# Patient Record
Sex: FEMALE | Race: BLACK OR AFRICAN AMERICAN | Employment: FULL TIME | URBAN - METROPOLITAN AREA
[De-identification: names, ages, dates, MRNs, and addresses within clinical notes are randomized per-mention and may not be internally consistent; named-entity substitution may affect disease eponyms.]

---

## 2022-12-05 ENCOUNTER — HOSPITAL ENCOUNTER (EMERGENCY)
Age: 47
Discharge: HOME | End: 2022-12-05
Attending: EMERGENCY MEDICINE
Payer: COMMERCIAL

## 2022-12-05 VITALS
SYSTOLIC BLOOD PRESSURE: 171 MMHG | HEART RATE: 68 BPM | BODY MASS INDEX: 19.63 KG/M2 | OXYGEN SATURATION: 99 % | WEIGHT: 115 LBS | RESPIRATION RATE: 18 BRPM | TEMPERATURE: 98.4 F | HEIGHT: 64 IN | DIASTOLIC BLOOD PRESSURE: 97 MMHG

## 2022-12-05 DIAGNOSIS — J04.0 LARYNGITIS, ACUTE: Primary | ICD-10-CM

## 2022-12-05 LAB
COVID-19 RAPID TEST, COVR: NOT DETECTED
FLUAV AG NPH QL IA: NEGATIVE
FLUBV AG NOSE QL IA: NEGATIVE
SOURCE, COVRS: NORMAL

## 2022-12-05 PROCEDURE — 74011250637 HC RX REV CODE- 250/637: Performed by: NURSE PRACTITIONER

## 2022-12-05 PROCEDURE — 87635 SARS-COV-2 COVID-19 AMP PRB: CPT

## 2022-12-05 PROCEDURE — 99283 EMERGENCY DEPT VISIT LOW MDM: CPT

## 2022-12-05 PROCEDURE — 87804 INFLUENZA ASSAY W/OPTIC: CPT

## 2022-12-05 RX ORDER — IBUPROFEN 800 MG/1
800 TABLET ORAL
Status: COMPLETED | OUTPATIENT
Start: 2022-12-05 | End: 2022-12-05

## 2022-12-05 RX ORDER — IBUPROFEN 800 MG/1
800 TABLET ORAL
Qty: 20 TABLET | Refills: 0 | Status: SHIPPED | OUTPATIENT
Start: 2022-12-05 | End: 2022-12-12

## 2022-12-05 RX ADMIN — IBUPROFEN 800 MG: 800 TABLET, FILM COATED ORAL at 20:26

## 2022-12-05 NOTE — ED TRIAGE NOTES
Patient arrives with c/o cough, sore throat, loss of voice, and nasal congestion 2 day. Denies N/V/D, fever, SOB, chest pain.

## 2022-12-06 NOTE — ED PROVIDER NOTES
Mj Peoples is a 52 y.o. F who presents to the ED today with CC of nasal congestion cough, sore throat, voice loss. PCP: None    There are no other complaints, changes or physical findings at this time. PMH: Denies chronic health conditions    Surgical History: None  Smoking: None  Alcohol: None  Drug Use: None  ALLERGIES: Patient has no known allergies. No past medical history on file. No past surgical history on file. No family history on file. Social History     Socioeconomic History    Marital status: SINGLE     Spouse name: Not on file    Number of children: Not on file    Years of education: Not on file    Highest education level: Not on file   Occupational History    Not on file   Tobacco Use    Smoking status: Not on file    Smokeless tobacco: Not on file   Substance and Sexual Activity    Alcohol use: Not on file    Drug use: Not on file    Sexual activity: Not on file   Other Topics Concern    Not on file   Social History Narrative    Not on file     Social Determinants of Health     Financial Resource Strain: Not on file   Food Insecurity: Not on file   Transportation Needs: Not on file   Physical Activity: Not on file   Stress: Not on file   Social Connections: Not on file   Intimate Partner Violence: Not on file   Housing Stability: Not on file             Review of Systems   Constitutional:  Positive for chills. Negative for fever. HENT:  Positive for congestion, postnasal drip, rhinorrhea, sore throat and voice change. Eyes:  Negative for discharge. Respiratory:  Negative for shortness of breath. Cardiovascular:  Negative for chest pain. Gastrointestinal:  Negative for nausea. Genitourinary:  Negative for dysuria. Musculoskeletal:  Negative for myalgias. Neurological:  Negative for seizures. Psychiatric/Behavioral:  Negative for behavioral problems.       Vitals:    12/05/22 1848   BP: (!) 171/97   Pulse: 68   Resp: 18   Temp: 98.4 °F (36.9 °C)   SpO2: 99%   Weight: 52.2 kg (115 lb)   Height: 5' 4\" (1.626 m)            Physical Exam  Constitutional:       Appearance: Normal appearance. HENT:      Head: Normocephalic and atraumatic. Right Ear: Tympanic membrane and ear canal normal.      Left Ear: Tympanic membrane and ear canal normal.      Nose: Congestion present. Mouth/Throat:      Mouth: Mucous membranes are moist.      Pharynx: Uvula midline. Posterior oropharyngeal erythema present. No oropharyngeal exudate or uvula swelling. Tonsils: No tonsillar exudate or tonsillar abscesses. 0 on the right. 0 on the left. Comments: Cobblestoning noted to back of pharynx  Eyes:      Pupils: Pupils are equal, round, and reactive to light. Cardiovascular:      Rate and Rhythm: Normal rate and regular rhythm. Heart sounds: Normal heart sounds. Pulmonary:      Effort: No respiratory distress. Breath sounds: No wheezing. Abdominal:      General: Abdomen is flat. Palpations: Abdomen is soft. Tenderness: There is no abdominal tenderness. Musculoskeletal:      Cervical back: Normal range of motion. Skin:     General: Skin is warm and dry. Neurological:      General: No focal deficit present. Mental Status: She is alert and oriented to person, place, and time. Mental status is at baseline. Cranial Nerves: No cranial nerve deficit. Motor: No weakness. Psychiatric:         Mood and Affect: Mood normal.         Behavior: Behavior normal.            LABORATORY RESULTS:  Recent Results (from the past 24 hour(s))   INFLUENZA A+B VIRAL AGS    Collection Time: 12/05/22  8:25 PM   Result Value Ref Range    Influenza A Antigen Negative NEG      Influenza B Antigen Negative NEG     COVID-19 RAPID TEST    Collection Time: 12/05/22  8:25 PM   Result Value Ref Range    Specimen source Nasopharyngeal      COVID-19 rapid test Not detected NOTD         IMAGING RESULTS:  No results found.     EKG INTERPRETATION:   See course summary for interpretation if ordered    MEDICATIONS GIVEN:  Medications   ibuprofen (MOTRIN) tablet 800 mg (800 mg Oral Given 12/5/22 2026)            MDM  Number of Diagnoses or Management Options  Laryngitis, acute  Diagnosis management comments: Seen today for flulike symptoms. No difficulty breathing, seizure, syncope. Eating and drinking appropriately, no change in bowel or bladder. Physical exam is unremarkable, in no acute distress, normal inspiratory and expiratory effort, lungs CTA bilaterally. No evidence of PTA, AOM. Oxygen saturation is 99 on room air. Considered differentials including Influenza, COVID-19, pneumonia. Positive for Influenza A. No indication for Tamiflu. Discharged with symptomatic management, Tylenol Motrin for any fevers/myalgias/pain. Recommend follow-up with PCP and given return  precautions. Recommended drinking plenty of fluids, resting, and staying home until fever free for 24 hours without the use of antipyretics. '           Discussed results and work-up with patient and answered all questions, the patient expresses understanding and agrees with the care plan and disposition. The patient was given an opportunity to ask questions and all concerns raised were addressed prior to discharge. Recommended patient follow-up with provider as listed below. Counseled patient on standard home and self-care measures. Specifically explained the emergent conditions that could arise and clearly instructed the patient to return to the emergency department for those and any other new, worsening, or concerning symptoms. Patient stable and ready for discharge. IMPRESSION:  1.  Laryngitis, acute        DISPOSITION:  Discharge    PLAN:  Follow-up Information       Follow up With Specialties Details Why [address] [facility] Emergency Medicine  As needed, If symptoms worsen [address] [address]  [phone]          Discharge Medication List as of 12/5/2022  8:57 PM Please note that this dictation was completed with [email or web address], the computer voice recognition software. Quite often unanticipated grammatical, syntax, homophones, and other interpretive errors are inadvertently transcribed by the computer software. Please disregard these errors. Please excuse any errors that have escaped final proofreading.

## 2022-12-06 NOTE — DISCHARGE INSTRUCTIONS
Tonight you were seen in the emergency room for a viral illness. You may continue to feel sick for the coming week or two, but there are some things you can do to feel better. I recommend increasing your fluid intake. Very warm or very cold fluids feel best when you have been coughing and have a sore throat. I recommend hot tea with honey, popsicles and soups. Rest often and stay away from others to control the spread of the virus    We do not have many good cough medicines that can eliminate your cough. That being said there have been multiple studies that show honey can improve/ quiet your cough. You can having this plain on a spoon or mixed into a hot drink such as tea. Viral illnesses can changes as they go along, likely your symptoms shall improve over time. If for any reason your symptoms worsen please follow-up with your primary care provider. If you have a high fever that is not relieved by over-the-counter fever reducing medications or difficulty breathing please come back to the emergency room for reevaluation.

## 2025-08-12 ENCOUNTER — HOSPITAL ENCOUNTER (EMERGENCY)
Facility: HOSPITAL | Age: 50
Discharge: HOME | End: 2025-08-13
Attending: EMERGENCY MEDICINE
Payer: COMMERCIAL

## 2025-08-12 VITALS
SYSTOLIC BLOOD PRESSURE: 139 MMHG | OXYGEN SATURATION: 100 % | DIASTOLIC BLOOD PRESSURE: 90 MMHG | TEMPERATURE: 97.9 F | BODY MASS INDEX: 21.34 KG/M2 | HEART RATE: 65 BPM | HEIGHT: 64 IN | WEIGHT: 125 LBS | RESPIRATION RATE: 16 BRPM

## 2025-08-12 DIAGNOSIS — H65.00 ACUTE SEROUS OTITIS MEDIA, RECURRENCE NOT SPECIFIED, UNSPECIFIED LATERALITY: ICD-10-CM

## 2025-08-12 DIAGNOSIS — H92.01 OTALGIA OF RIGHT EAR: Primary | ICD-10-CM

## 2025-08-12 PROCEDURE — 99283 EMERGENCY DEPT VISIT LOW MDM: CPT

## 2025-08-12 ASSESSMENT — PAIN DESCRIPTION - LOCATION: LOCATION: EAR

## 2025-08-12 ASSESSMENT — PAIN SCALES - GENERAL: PAINLEVEL_OUTOF10: 8

## 2025-08-12 ASSESSMENT — LIFESTYLE VARIABLES
HOW OFTEN DO YOU HAVE A DRINK CONTAINING ALCOHOL: NEVER
HOW MANY STANDARD DRINKS CONTAINING ALCOHOL DO YOU HAVE ON A TYPICAL DAY: PATIENT DOES NOT DRINK

## 2025-08-12 ASSESSMENT — PAIN - FUNCTIONAL ASSESSMENT: PAIN_FUNCTIONAL_ASSESSMENT: 0-10

## 2025-08-12 ASSESSMENT — PAIN DESCRIPTION - ORIENTATION: ORIENTATION: RIGHT

## 2025-08-13 PROCEDURE — 6370000000 HC RX 637 (ALT 250 FOR IP): Performed by: EMERGENCY MEDICINE

## 2025-08-13 RX ORDER — ACETAMINOPHEN 500 MG
1000 TABLET ORAL
Status: COMPLETED | OUTPATIENT
Start: 2025-08-13 | End: 2025-08-13

## 2025-08-13 RX ORDER — ACETAMINOPHEN 500 MG
1000 TABLET ORAL 3 TIMES DAILY
Qty: 60 TABLET | Refills: 0 | Status: SHIPPED | OUTPATIENT
Start: 2025-08-13 | End: 2025-08-23

## 2025-08-13 RX ORDER — IBUPROFEN 800 MG/1
800 TABLET, FILM COATED ORAL EVERY 8 HOURS PRN
Qty: 30 TABLET | Refills: 0 | Status: SHIPPED | OUTPATIENT
Start: 2025-08-13

## 2025-08-13 RX ORDER — IBUPROFEN 800 MG/1
800 TABLET, FILM COATED ORAL
Status: COMPLETED | OUTPATIENT
Start: 2025-08-13 | End: 2025-08-13

## 2025-08-13 RX ADMIN — ACETAMINOPHEN 1000 MG: 500 TABLET ORAL at 01:13

## 2025-08-13 RX ADMIN — IBUPROFEN 800 MG: 800 TABLET, FILM COATED ORAL at 01:13

## 2025-08-13 RX ADMIN — AMOXICILLIN AND CLAVULANATE POTASSIUM 1 TABLET: 875; 125 TABLET, FILM COATED ORAL at 01:13
